# Patient Record
Sex: MALE | Race: OTHER | NOT HISPANIC OR LATINO | URBAN - METROPOLITAN AREA
[De-identification: names, ages, dates, MRNs, and addresses within clinical notes are randomized per-mention and may not be internally consistent; named-entity substitution may affect disease eponyms.]

---

## 2018-10-26 ENCOUNTER — EMERGENCY (EMERGENCY)
Facility: HOSPITAL | Age: 73
LOS: 1 days | Discharge: HOME | End: 2018-10-26
Attending: EMERGENCY MEDICINE
Payer: MEDICARE

## 2018-10-26 VITALS
HEART RATE: 87 BPM | RESPIRATION RATE: 14 BRPM | WEIGHT: 171.08 LBS | DIASTOLIC BLOOD PRESSURE: 74 MMHG | SYSTOLIC BLOOD PRESSURE: 120 MMHG | TEMPERATURE: 98 F

## 2018-10-26 DIAGNOSIS — Z98.89 OTHER SPECIFIED POSTPROCEDURAL STATES: Chronic | ICD-10-CM

## 2018-10-26 PROCEDURE — 73590 X-RAY EXAM OF LOWER LEG: CPT | Mod: 26,LT

## 2018-10-26 PROCEDURE — 93971 EXTREMITY STUDY: CPT | Mod: 26,LT

## 2018-10-26 PROCEDURE — 93971 EXTREMITY STUDY: CPT

## 2018-10-26 PROCEDURE — 99284 EMERGENCY DEPT VISIT MOD MDM: CPT | Mod: 25

## 2018-10-26 PROCEDURE — 73590 X-RAY EXAM OF LOWER LEG: CPT

## 2018-10-26 PROCEDURE — 99284 EMERGENCY DEPT VISIT MOD MDM: CPT

## 2018-10-26 NOTE — ED PROVIDER NOTE - CARE PLAN
Principal Discharge DX:	Leg swelling Principal Discharge DX:	Leg swelling  Secondary Diagnosis:	Hematoma

## 2018-10-26 NOTE — ED PROVIDER NOTE - OBJECTIVE STATEMENT
74 yo male hx of CAD on plavix, HLD, HTN c/o left lower leg pain, bruising and swelling x 4 days after he said he felt something hit him in his left calf while walking in [facility].  No chest pain, no shortness of breath  Here for evaluation.

## 2018-10-26 NOTE — ED PROVIDER NOTE - NS ED ROS FT

## 2018-10-26 NOTE — ED PROVIDER NOTE - PROGRESS NOTE DETAILS
imaging explained to patient, will f/u with PMD Dr. Mckee, told to elevated left leg, warm compress imaging explained to patient, will f/u with PMD Dr. Thurman, told to elevated left leg, warm compress.  Patient keeps insisting on having a "shot" and antibiotics, but explained that those were not necessary.  R&B explained.

## 2018-10-26 NOTE — ED PROVIDER NOTE - PHYSICAL EXAMINATION
Gen: Alert, NAD  Head/eyes: NC/AT, PERRL, EOMI, normal lids/conjunctiva, no scleral icterus  ENT: Bilateral TM WNL, normal hearing, patent oropharynx without erythema/exudate, uvula midline, no peritonsillar abscess, no tongue/uvula swelling  Neck: supple, no tenderness/meningismus/JVD, Trachea midline  Pulm: Bilateral clear BS, normal resp effort, no wheeze/stridor/retractions  CV: RRR, no M/R/G, +2 dist pulses (radial, pedal DP/PT, popliteal)  Abd: soft, NT/ND, +BS, no guarding/rebound tenderness  Musculoskeletal: no edema/erythema/cyanosis, FROM in all extremities, no C/T/L spine ttp  Skin: no rash, no vesicles, no petechaie, left lower leg + ecchymosis on lateral side and near heel of left foot, + swelling  Neuro: AAOx3, CN 2-12 intact, normal sensation, 5/5 motor strength in all extremities, normal gait, no dysmetria Gen: Alert, NAD  Head/eyes: NC/AT, PERRL, EOMI, normal lids/conjunctiva, no scleral icterus  ENT: Bilateral TM WNL, normal hearing, patent oropharynx without erythema/exudate, uvula midline, no peritonsillar abscess, no tongue/uvula swelling  Neck: supple, no tenderness/meningismus/JVD, Trachea midline  Pulm: Bilateral clear BS, normal resp effort, no wheeze/stridor/retractions  CV: RRR, no M/R/G, +2 dopplerable dist pulses (radial, pedal DP/PT, popliteal)  Abd: soft, NT/ND, +BS, no guarding/rebound tenderness  Musculoskeletal: no edema/erythema/cyanosis, FROM in all extremities, no C/T/L spine ttp  Skin: no rash, no vesicles, no petechaie, left lower leg + ecchymosis on lateral side and near heel of left foot, + swelling  Neuro: AAOx3, CN 2-12 intact, normal sensation, 5/5 motor strength in all extremities, normal gait, no dysmetria

## 2018-10-26 NOTE — ED ADULT NURSE NOTE - OBJECTIVE STATEMENT
Received the patient in the Er. Patient is alert and oriented. Skin warm and dry. C/O pain and swelling to left lower leg for 4 days. Hematoma noticed all over left lower leg and foot. Denies any injury. As per patient sudden onset of pain came to left leg while walking.

## 2018-10-26 NOTE — ED ADULT NURSE NOTE - NSIMPLEMENTINTERV_GEN_ALL_ED
Implemented All Fall Risk Interventions:  [place] to call system. Call bell, personal items and telephone within reach. Instruct patient to call for assistance. Room bathroom lighting operational. Non-slip footwear when patient is off stretcher. Physically safe environment: no spills, clutter or unnecessary equipment. Stretcher in lowest position, wheels locked, appropriate side rails in place. Provide visual cue, wrist band, yellow gown, etc. Monitor gait and stability. Monitor for mental status changes and reorient to person, place, and time. Review medications for side effects contributing to fall risk. Reinforce activity limits and safety measures with patient and family.

## 2019-04-26 ENCOUNTER — EMERGENCY (EMERGENCY)
Facility: HOSPITAL | Age: 74
LOS: 1 days | Discharge: HOME | End: 2019-04-26
Attending: EMERGENCY MEDICINE | Admitting: EMERGENCY MEDICINE
Payer: MEDICARE

## 2019-04-26 VITALS
DIASTOLIC BLOOD PRESSURE: 75 MMHG | SYSTOLIC BLOOD PRESSURE: 129 MMHG | TEMPERATURE: 98 F | HEART RATE: 85 BPM | HEIGHT: 67 IN | WEIGHT: 179.9 LBS | RESPIRATION RATE: 18 BRPM | OXYGEN SATURATION: 99 %

## 2019-04-26 DIAGNOSIS — Z98.89 OTHER SPECIFIED POSTPROCEDURAL STATES: Chronic | ICD-10-CM

## 2019-04-26 LAB — S PYO AG SPEC QL IA: NEGATIVE — SIGNIFICANT CHANGE UP

## 2019-04-26 PROCEDURE — 99283 EMERGENCY DEPT VISIT LOW MDM: CPT | Mod: 25

## 2019-04-26 PROCEDURE — 87880 STREP A ASSAY W/OPTIC: CPT

## 2019-04-26 PROCEDURE — 99283 EMERGENCY DEPT VISIT LOW MDM: CPT

## 2019-04-26 PROCEDURE — 71046 X-RAY EXAM CHEST 2 VIEWS: CPT | Mod: 26

## 2019-04-26 PROCEDURE — 71046 X-RAY EXAM CHEST 2 VIEWS: CPT

## 2019-04-26 PROCEDURE — 87081 CULTURE SCREEN ONLY: CPT

## 2019-04-26 RX ORDER — POLYMYXIN B SULF/TRIMETHOPRIM 10000-1/ML
1 DROPS OPHTHALMIC (EYE) ONCE
Qty: 0 | Refills: 0 | Status: COMPLETED | OUTPATIENT
Start: 2019-04-26 | End: 2019-04-26

## 2019-04-26 RX ADMIN — Medication 1 DROP(S): at 14:38

## 2019-04-26 NOTE — ED PROVIDER NOTE - OBJECTIVE STATEMENT
75 y/o male hx of CAD, HTN, HLD who presents with 2 days of sore throat, dry cough and watery eyes. no fever/chills. no sputum production. no chest pain/sob/abd pain/v/d. no sick contacts or recent travel.  no trouble drinking or managing secretions.

## 2019-04-26 NOTE — ED PROVIDER NOTE - CLINICAL SUMMARY MEDICAL DECISION MAKING FREE TEXT BOX
73 y/o male hx of CAD, HTN, HLD who presents with 2 days of sore throat, dry cough and watery eyes. patient well appearing with normal vital signs. + B/L eyes with purulent drainage and conjunctival injection, mouth and throat WNL, Lungs CTAB. will r/o strep and pna and treat bacterial conjunctivitis - Maria Del Rosario Chisholm PA-C

## 2019-04-26 NOTE — ED PROVIDER NOTE - ATTENDING CONTRIBUTION TO CARE
73 yo M p/w R eye redness , followed by L eye redness. pt awoke today with eye discharge.  Pt with mild cough, slight sore throat as well. no dysphagia. No cp/sob/palp. No fever/chills. no weakness / dizzy / malaise. No agg/allev factors. no other inj or co.  exam: mm moist. neck supple. no meningeal signs. lungs CTA bl, no w/r/r Pos bl conjuncival  injection , watery dc. no FB seen. Nl visual acuity. no other acute findings.  Conjunctivitis (no contacts), URI. Check XR, topical abx, outpt fu

## 2019-04-26 NOTE — ED ADULT NURSE NOTE - OBJECTIVE STATEMENT
Received the patient in the Er. Patient is alert and oriented. Skin warm and dry. /O sore throat and redness to left eye. Received the patient in the Er. Patient is alert and oriented. Skin warm and dry. /O sore throat and redness to bilateral eye.

## 2019-04-26 NOTE — ED PROVIDER NOTE - PLAN OF CARE
stay hydrated  Follow up with your Primary Care Physician within the next 2-3 days  Bring a copy of your test results with you to your appointment  Continue your current medication regimen  Return to the Emergency Room if you experience new or worsening symptoms  Take Tylenol 650mg every 6 hrs as needed for fever  2 drops of antibiotic eye drop every 4 hours for 10 days  conjunctivitis is very contagious so be sure to wash hands frequently with soap and water.  you can also purchase over the counter cough and cold medication for symptoms

## 2019-04-26 NOTE — ED PROVIDER NOTE - CARE PLAN
Principal Discharge DX:	URI (upper respiratory infection) Principal Discharge DX:	URI (upper respiratory infection)  Assessment and plan of treatment:	stay hydrated  Follow up with your Primary Care Physician within the next 2-3 days  Bring a copy of your test results with you to your appointment  Continue your current medication regimen  Return to the Emergency Room if you experience new or worsening symptoms  Take Tylenol 650mg every 6 hrs as needed for fever  2 drops of antibiotic eye drop every 4 hours for 10 days  conjunctivitis is very contagious so be sure to wash hands frequently with soap and water.  you can also purchase over the counter cough and cold medication for symptoms

## 2019-04-26 NOTE — ED PROVIDER NOTE - EYES, MLM
Clear bilaterally, pupils equal, round and reactive to light. + conjunctival injection bilaterally with + purulent drainage

## 2019-04-28 LAB
CULTURE RESULTS: SIGNIFICANT CHANGE UP
SPECIMEN SOURCE: SIGNIFICANT CHANGE UP

## 2019-05-27 NOTE — ED ADULT NURSE NOTE - NS ED NURSE RECORD ANOTHER HT AND WT
Yes Scribe Attestation (For Scribes USE Only)... Attending Attestation (For Attendings USE Only).../Scribe Attestation (For Scribes USE Only)...

## 2020-12-04 NOTE — ED ADULT NURSE NOTE - NS ED NURSE RECORD ANOTHER VITAL SIGN
December 4, 2020      Ambrose Melendez MD  [address]  [facility] [address]           [facility] 4[facility] Fl  [address]  [facility] [address]  Phone: [phone]          Patient: Braeden Keita   MR Number: [id number]   YOB: 1997   Date of Visit: 12/4/2020       Dear Dr. Ambrose Melendez:    Thank you for referring Braeden Keita to me for evaluation. Attached you will find relevant portions of my assessment and plan of care.    If you have questions, please do not hesitate to call me. I look forward to following Braeden Keita along with you.    Sincerely,    Carlyn Barrios MD    Enclosure  CC:  No Recipients    If you would like to receive this communication electronically, please contact [email or web address] or [phone] to request more information on [employer] access.    For providers and/or their staff who would like to refer a patient to [facility], please contact us through our one-stop-shop provider referral line, [facility], at [phone].    If you feel you have received this communication in error or would no longer like to receive these types of communications, please e-mail [email or web address]         
Yes

## 2021-12-12 ENCOUNTER — INPATIENT (INPATIENT)
Facility: HOSPITAL | Age: 76
LOS: 0 days | Discharge: ANOTHER HOSPITAL | DRG: 303 | End: 2021-12-13
Attending: INTERNAL MEDICINE | Admitting: INTERNAL MEDICINE
Payer: MEDICARE

## 2021-12-12 VITALS
SYSTOLIC BLOOD PRESSURE: 170 MMHG | HEART RATE: 66 BPM | OXYGEN SATURATION: 97 % | HEIGHT: 67 IN | WEIGHT: 177.91 LBS | TEMPERATURE: 98 F | RESPIRATION RATE: 18 BRPM | DIASTOLIC BLOOD PRESSURE: 90 MMHG

## 2021-12-12 DIAGNOSIS — Z98.89 OTHER SPECIFIED POSTPROCEDURAL STATES: Chronic | ICD-10-CM

## 2021-12-12 DIAGNOSIS — I20.0 UNSTABLE ANGINA: ICD-10-CM

## 2021-12-12 LAB
ALBUMIN SERPL ELPH-MCNC: 3.4 G/DL — SIGNIFICANT CHANGE UP (ref 3.3–5)
ALP SERPL-CCNC: 95 U/L — SIGNIFICANT CHANGE UP (ref 40–120)
ALT FLD-CCNC: 38 U/L — SIGNIFICANT CHANGE UP (ref 12–78)
ANION GAP SERPL CALC-SCNC: 6 MMOL/L — SIGNIFICANT CHANGE UP (ref 5–17)
APTT BLD: 31.3 SEC — SIGNIFICANT CHANGE UP (ref 27.5–35.5)
AST SERPL-CCNC: 25 U/L — SIGNIFICANT CHANGE UP (ref 15–37)
BASOPHILS # BLD AUTO: 0.06 K/UL — SIGNIFICANT CHANGE UP (ref 0–0.2)
BASOPHILS NFR BLD AUTO: 1 % — SIGNIFICANT CHANGE UP (ref 0–2)
BILIRUB SERPL-MCNC: 0.7 MG/DL — SIGNIFICANT CHANGE UP (ref 0.2–1.2)
BUN SERPL-MCNC: 16 MG/DL — SIGNIFICANT CHANGE UP (ref 7–23)
CALCIUM SERPL-MCNC: 8.7 MG/DL — SIGNIFICANT CHANGE UP (ref 8.5–10.1)
CHLORIDE SERPL-SCNC: 107 MMOL/L — SIGNIFICANT CHANGE UP (ref 96–108)
CO2 SERPL-SCNC: 28 MMOL/L — SIGNIFICANT CHANGE UP (ref 22–31)
CREAT SERPL-MCNC: 1.4 MG/DL — HIGH (ref 0.5–1.3)
EOSINOPHIL # BLD AUTO: 0.23 K/UL — SIGNIFICANT CHANGE UP (ref 0–0.5)
EOSINOPHIL NFR BLD AUTO: 4 % — SIGNIFICANT CHANGE UP (ref 0–6)
GLUCOSE SERPL-MCNC: 160 MG/DL — HIGH (ref 70–99)
HCT VFR BLD CALC: 41.1 % — SIGNIFICANT CHANGE UP (ref 39–50)
HGB BLD-MCNC: 14.5 G/DL — SIGNIFICANT CHANGE UP (ref 13–17)
IMM GRANULOCYTES NFR BLD AUTO: 0.2 % — SIGNIFICANT CHANGE UP (ref 0–1.5)
INR BLD: 1.02 RATIO — SIGNIFICANT CHANGE UP (ref 0.88–1.16)
LIDOCAIN IGE QN: 181 U/L — SIGNIFICANT CHANGE UP (ref 73–393)
LYMPHOCYTES # BLD AUTO: 2.52 K/UL — SIGNIFICANT CHANGE UP (ref 1–3.3)
LYMPHOCYTES # BLD AUTO: 43.8 % — SIGNIFICANT CHANGE UP (ref 13–44)
MAGNESIUM SERPL-MCNC: 2.3 MG/DL — SIGNIFICANT CHANGE UP (ref 1.6–2.6)
MCHC RBC-ENTMCNC: 31.7 PG — SIGNIFICANT CHANGE UP (ref 27–34)
MCHC RBC-ENTMCNC: 35.3 GM/DL — SIGNIFICANT CHANGE UP (ref 32–36)
MCV RBC AUTO: 89.9 FL — SIGNIFICANT CHANGE UP (ref 80–100)
MONOCYTES # BLD AUTO: 0.58 K/UL — SIGNIFICANT CHANGE UP (ref 0–0.9)
MONOCYTES NFR BLD AUTO: 10.1 % — SIGNIFICANT CHANGE UP (ref 2–14)
NEUTROPHILS # BLD AUTO: 2.35 K/UL — SIGNIFICANT CHANGE UP (ref 1.8–7.4)
NEUTROPHILS NFR BLD AUTO: 40.9 % — LOW (ref 43–77)
NRBC # BLD: 0 /100 WBCS — SIGNIFICANT CHANGE UP (ref 0–0)
PLATELET # BLD AUTO: 170 K/UL — SIGNIFICANT CHANGE UP (ref 150–400)
POTASSIUM SERPL-MCNC: 4.1 MMOL/L — SIGNIFICANT CHANGE UP (ref 3.5–5.3)
POTASSIUM SERPL-SCNC: 4.1 MMOL/L — SIGNIFICANT CHANGE UP (ref 3.5–5.3)
PROT SERPL-MCNC: 7 G/DL — SIGNIFICANT CHANGE UP (ref 6–8.3)
PROTHROM AB SERPL-ACNC: 11.9 SEC — SIGNIFICANT CHANGE UP (ref 10.6–13.6)
RBC # BLD: 4.57 M/UL — SIGNIFICANT CHANGE UP (ref 4.2–5.8)
RBC # FLD: 13.2 % — SIGNIFICANT CHANGE UP (ref 10.3–14.5)
SARS-COV-2 RNA SPEC QL NAA+PROBE: SIGNIFICANT CHANGE UP
SODIUM SERPL-SCNC: 141 MMOL/L — SIGNIFICANT CHANGE UP (ref 135–145)
TROPONIN I, HIGH SENSITIVITY RESULT: 9.5 NG/L — SIGNIFICANT CHANGE UP
TROPONIN I, HIGH SENSITIVITY RESULT: 9.8 NG/L — SIGNIFICANT CHANGE UP
WBC # BLD: 5.75 K/UL — SIGNIFICANT CHANGE UP (ref 3.8–10.5)
WBC # FLD AUTO: 5.75 K/UL — SIGNIFICANT CHANGE UP (ref 3.8–10.5)

## 2021-12-12 PROCEDURE — 71045 X-RAY EXAM CHEST 1 VIEW: CPT | Mod: 26

## 2021-12-12 PROCEDURE — 93010 ELECTROCARDIOGRAM REPORT: CPT

## 2021-12-12 PROCEDURE — 99285 EMERGENCY DEPT VISIT HI MDM: CPT

## 2021-12-12 RX ORDER — ATORVASTATIN CALCIUM 80 MG/1
40 TABLET, FILM COATED ORAL AT BEDTIME
Refills: 0 | Status: DISCONTINUED | OUTPATIENT
Start: 2021-12-12 | End: 2021-12-13

## 2021-12-12 RX ORDER — HEPARIN SODIUM 5000 [USP'U]/ML
5000 INJECTION INTRAVENOUS; SUBCUTANEOUS ONCE
Refills: 0 | Status: COMPLETED | OUTPATIENT
Start: 2021-12-12 | End: 2021-12-12

## 2021-12-12 RX ORDER — ACETAMINOPHEN 500 MG
650 TABLET ORAL EVERY 6 HOURS
Refills: 0 | Status: DISCONTINUED | OUTPATIENT
Start: 2021-12-12 | End: 2021-12-13

## 2021-12-12 RX ORDER — ATENOLOL 25 MG/1
50 TABLET ORAL DAILY
Refills: 0 | Status: DISCONTINUED | OUTPATIENT
Start: 2021-12-12 | End: 2021-12-13

## 2021-12-12 RX ORDER — HEPARIN SODIUM 5000 [USP'U]/ML
5000 INJECTION INTRAVENOUS; SUBCUTANEOUS EVERY 6 HOURS
Refills: 0 | Status: DISCONTINUED | OUTPATIENT
Start: 2021-12-12 | End: 2021-12-13

## 2021-12-12 RX ORDER — RANOLAZINE 500 MG/1
1000 TABLET, FILM COATED, EXTENDED RELEASE ORAL
Refills: 0 | Status: DISCONTINUED | OUTPATIENT
Start: 2021-12-12 | End: 2021-12-13

## 2021-12-12 RX ORDER — CLOPIDOGREL BISULFATE 75 MG/1
75 TABLET, FILM COATED ORAL DAILY
Refills: 0 | Status: DISCONTINUED | OUTPATIENT
Start: 2021-12-12 | End: 2021-12-13

## 2021-12-12 RX ORDER — ASPIRIN/CALCIUM CARB/MAGNESIUM 324 MG
325 TABLET ORAL DAILY
Refills: 0 | Status: DISCONTINUED | OUTPATIENT
Start: 2021-12-13 | End: 2021-12-13

## 2021-12-12 RX ORDER — ASPIRIN/CALCIUM CARB/MAGNESIUM 324 MG
325 TABLET ORAL ONCE
Refills: 0 | Status: COMPLETED | OUTPATIENT
Start: 2021-12-12 | End: 2021-12-12

## 2021-12-12 RX ORDER — HEPARIN SODIUM 5000 [USP'U]/ML
INJECTION INTRAVENOUS; SUBCUTANEOUS
Qty: 25000 | Refills: 0 | Status: DISCONTINUED | OUTPATIENT
Start: 2021-12-12 | End: 2021-12-13

## 2021-12-12 RX ORDER — LOSARTAN POTASSIUM 100 MG/1
50 TABLET, FILM COATED ORAL DAILY
Refills: 0 | Status: DISCONTINUED | OUTPATIENT
Start: 2021-12-12 | End: 2021-12-12

## 2021-12-12 RX ADMIN — HEPARIN SODIUM 1000 UNIT(S)/HR: 5000 INJECTION INTRAVENOUS; SUBCUTANEOUS at 23:10

## 2021-12-12 RX ADMIN — Medication 325 MILLIGRAM(S): at 18:23

## 2021-12-12 RX ADMIN — HEPARIN SODIUM 1000 UNIT(S)/HR: 5000 INJECTION INTRAVENOUS; SUBCUTANEOUS at 20:56

## 2021-12-12 RX ADMIN — HEPARIN SODIUM 5000 UNIT(S): 5000 INJECTION INTRAVENOUS; SUBCUTANEOUS at 20:55

## 2021-12-12 NOTE — ED PROVIDER NOTE - CLINICAL SUMMARY MEDICAL DECISION MAKING FREE TEXT BOX
77 yo male who has hx of acs cad with stent now worsening anginal sx wthi mcnair  ro acs ro other causes of mcnair with cp and neck pain   ekg cardiac monitor lyges labs trop   cards consultation. asa

## 2021-12-12 NOTE — ED PROVIDER NOTE - PROGRESS NOTE DETAILS
cardiology consulted dr PREET krishnamurthy per cards pt needs cath to be admitted for unstable angina and transferred in am   dr umm kirk for dr krystyna guzman for admission

## 2021-12-12 NOTE — ED ADULT NURSE NOTE - OBJECTIVE STATEMENT
Patient A/Ox4 with a steady gait. Wife at bedside. Patient c/o CP that worsens with walking and worsened over the past week. Has significant medical hx including stents. Telemetry monitor on. Call light in reach.

## 2021-12-12 NOTE — ED PROVIDER NOTE - OBJECTIVE STATEMENT
Pt is  a 77 yo male hx of cad with stent at [facility] htn hld was in [facility] until yest when he began to have increased anginal sx (throat pain and dyspnea on exertion) today the pain got worse and went into his chest so he came to er.  His pmd is dr Thurman he has no local cardiologist. no allergies to meds  and at this moment he has no pain or discomfort.  no edema

## 2021-12-12 NOTE — CONSULT NOTE ADULT - ASSESSMENT
76 year old M with hx CAD s/p remote PCI and known residual closed vessel, HTN, HLD boderline DM presents with angina    - Now with typical anginal symptoms.   - EKG without sign ischemic changes  - Check trops  - would give full dose AC until Dae neg  - Cont DAPT and statin  - cont bb  - cont statin  - cont ranexa and norvasc for antianginal support  - will plan on coronary angiography brianda barring any major lab derrangements. Risk (including but not limited to periprocedureal MI and death), benefits, alternatives fully explained to the patient. The patient agrees to the procedure.  - monitor tele  - Further cardiac workup will depend on clinical course.

## 2021-12-12 NOTE — CONSULT NOTE ADULT - SUBJECTIVE AND OBJECTIVE BOX
CHIEF COMPLAINT: Patient is a 76y old  Male who presents with a chief complaint of chest     HPI: 76 year old M with hx CAD s/p remote PCI and known residual closed vessel, HTN, HLD boderline DM presents with CP. Has been having pressure like CP with walking for last 2 weeks similar to pain he experienced prior to his last PCI. Now wtih CP at rest. + SOB  Denies any   palpitations, PND, orthopnea, near syncope, syncope, lower extremity edema, stroke like symptoms.       EKG: SR    REVIEW OF SYSTEMS:   All other review of systems are negative unless indicated above    PAST MEDICAL & SURGICAL HISTORY:  High cholesterol    Cardiac disease    HTN (hypertension)    H/O angioplasty        SOCIAL HISTORY:  No tobacco, ethanol, or drug abuse.    FAMILY HISTORY:    No family history of acute MI or sudden cardiac death.    MEDICATIONS  (STANDING):    MEDICATIONS  (PRN):      Allergies    No Known Allergies    Intolerances        Home meds:  Home Medications:  aspirin 325 mg oral tablet: 1 tab(s) orally once a day (26 Oct 2018 14:36)  atenolol 50 mg oral tablet: 1 tab(s) orally once a day (26 Oct 2018 14:36)  atorvastatin 40 mg oral tablet: 1 tab(s) orally once a day (at bedtime) (26 Oct 2018 14:36)  clopidogrel 75 mg oral tablet: 1 tab(s) orally once a day (26 Oct 2018 14:36)  famotidine 10 mg/mL intravenous solution: 2 milliliter(s) intravenous every 12 hours (26 Oct 2018 14:36)  losartan 50 mg oral tablet: 1 tab(s) orally once a day (26 Oct 2018 14:36)  ranolazine 1000 mg oral tablet, extended release: 1 tab(s) orally once a day (26 Oct 2018 14:36)        VITAL SIGNS:   Vital Signs Last 24 Hrs  T(C): 36.7 (12 Dec 2021 17:40), Max: 36.7 (12 Dec 2021 17:40)  T(F): 98 (12 Dec 2021 17:40), Max: 98 (12 Dec 2021 17:40)  HR: 65 (12 Dec 2021 18:21) (65 - 66)  BP: 155/90 (12 Dec 2021 18:21) (155/90 - 170/90)  BP(mean): 90 (12 Dec 2021 18:21) (90 - 90)  RR: 18 (12 Dec 2021 18:21) (18 - 18)  SpO2: 98% (12 Dec 2021 18:21) (97% - 98%)    I&O's Summary      On Exam:     Constitutional: NAD, awake and alert, well-developed  HEENT: Moist Mucous Membranes, Anicteric  Pulmonary: Non-labored, breath sounds are clear bilaterally, No wheezing, rales or rhonchi  Cardiovascular: Regular, S1 and S2, No murmurs, rubs, gallops or clicks  Gastrointestinal: Bowel Sounds present, soft, nontender.   Lymph: No peripheral edema. No lymphadenopathy.  Skin: No visible rashes or ulcers.  Psych:  Mood & affect appropriate    LABS: All Labs Reviewed:                Blood Culture:         RADIOLOGY:

## 2021-12-12 NOTE — H&P ADULT - HISTORY OF PRESENT ILLNESS
76 year old M with hx CAD s/p remote PCI and known residual closed vessel, HTN, HLD boderline DM presents with CP. Has been having pressure like CP with walking for last 2 weeks similar to pain he experienced prior to his last PCI. Now wtih CP at rest. + SOB  Denies any   palpitations, PND, orthopnea, near syncope, syncope, lower extremity edema, stroke like symptoms.

## 2021-12-12 NOTE — H&P ADULT - NSHPPHYSICALEXAM_GEN_ALL_CORE
General: WN/WD NAD  PERRLA  Neurology: A&Ox3, nonfocal, ENRIQUEZ x 4  Respiratory: CTA B/L  CV: RRR, S1S2, no murmurs, rubs or gallops  Abdominal: Soft, NT, ND +BS, Last BM  Extremities: No edema, + peripheral pulses  Skin Normal

## 2021-12-12 NOTE — H&P ADULT - NSHPLABSRESULTS_GEN_ALL_CORE
Lab Results:  CBC  CBC Full  -  ( 12 Dec 2021 18:37 )  WBC Count : 5.75 K/uL  RBC Count : 4.57 M/uL  Hemoglobin : 14.5 g/dL  Hematocrit : 41.1 %  Platelet Count - Automated : 170 K/uL  Mean Cell Volume : 89.9 fl  Mean Cell Hemoglobin : 31.7 pg  Mean Cell Hemoglobin Concentration : 35.3 gm/dL  Auto Neutrophil # : 2.35 K/uL  Auto Lymphocyte # : 2.52 K/uL  Auto Monocyte # : 0.58 K/uL  Auto Eosinophil # : 0.23 K/uL  Auto Basophil # : 0.06 K/uL  Auto Neutrophil % : 40.9 %  Auto Lymphocyte % : 43.8 %  Auto Monocyte % : 10.1 %  Auto Eosinophil % : 4.0 %  Auto Basophil % : 1.0 %    .		Differential:	[] Automated		[] Manual  Chemistry                        14.5   5.75  )-----------( 170      ( 12 Dec 2021 18:37 )             41.1     12-12    141  |  107  |  16  ----------------------------<  160<H>  4.1   |  28  |  1.40<H>    Ca    8.7      12 Dec 2021 18:37  Mg     2.3     12-12    TPro  7.0  /  Alb  3.4  /  TBili  0.7  /  DBili  x   /  AST  25  /  ALT  38  /  AlkPhos  95  12-12    LIVER FUNCTIONS - ( 12 Dec 2021 18:37 )  Alb: 3.4 g/dL / Pro: 7.0 g/dL / ALK PHOS: 95 U/L / ALT: 38 U/L / AST: 25 U/L / GGT: x                     MICROBIOLOGY/CULTURES:      RADIOLOGY RESULTS: reviewed

## 2021-12-13 ENCOUNTER — INPATIENT (INPATIENT)
Facility: HOSPITAL | Age: 76
LOS: 0 days | Discharge: HOME | DRG: 287 | End: 2021-12-13
Attending: INTERNAL MEDICINE | Admitting: INTERNAL MEDICINE
Payer: MEDICARE

## 2021-12-13 ENCOUNTER — TRANSCRIPTION ENCOUNTER (OUTPATIENT)
Age: 76
End: 2021-12-13

## 2021-12-13 VITALS
DIASTOLIC BLOOD PRESSURE: 107 MMHG | WEIGHT: 177.91 LBS | SYSTOLIC BLOOD PRESSURE: 165 MMHG | RESPIRATION RATE: 17 BRPM | TEMPERATURE: 98 F | HEART RATE: 57 BPM | HEIGHT: 67 IN | OXYGEN SATURATION: 100 %

## 2021-12-13 VITALS
OXYGEN SATURATION: 96 % | HEART RATE: 55 BPM | RESPIRATION RATE: 18 BRPM | TEMPERATURE: 97 F | DIASTOLIC BLOOD PRESSURE: 97 MMHG | SYSTOLIC BLOOD PRESSURE: 153 MMHG

## 2021-12-13 VITALS
HEART RATE: 55 BPM | DIASTOLIC BLOOD PRESSURE: 88 MMHG | SYSTOLIC BLOOD PRESSURE: 148 MMHG | OXYGEN SATURATION: 98 % | RESPIRATION RATE: 17 BRPM

## 2021-12-13 DIAGNOSIS — R07.9 CHEST PAIN, UNSPECIFIED: ICD-10-CM

## 2021-12-13 DIAGNOSIS — Z98.89 OTHER SPECIFIED POSTPROCEDURAL STATES: Chronic | ICD-10-CM

## 2021-12-13 LAB
ALBUMIN SERPL ELPH-MCNC: 3.4 G/DL — SIGNIFICANT CHANGE UP (ref 3.3–5)
ALP SERPL-CCNC: 81 U/L — SIGNIFICANT CHANGE UP (ref 40–120)
ALT FLD-CCNC: 36 U/L — SIGNIFICANT CHANGE UP (ref 12–78)
ANION GAP SERPL CALC-SCNC: 5 MMOL/L — SIGNIFICANT CHANGE UP (ref 5–17)
APTT BLD: 83 SEC — HIGH (ref 27.5–35.5)
APTT BLD: > 200 SEC (ref 27.5–35.5)
AST SERPL-CCNC: 18 U/L — SIGNIFICANT CHANGE UP (ref 15–37)
BILIRUB SERPL-MCNC: 0.6 MG/DL — SIGNIFICANT CHANGE UP (ref 0.2–1.2)
BUN SERPL-MCNC: 14 MG/DL — SIGNIFICANT CHANGE UP (ref 7–23)
CALCIUM SERPL-MCNC: 8.8 MG/DL — SIGNIFICANT CHANGE UP (ref 8.5–10.1)
CHLORIDE SERPL-SCNC: 109 MMOL/L — HIGH (ref 96–108)
CK SERPL-CCNC: 65 U/L — SIGNIFICANT CHANGE UP (ref 26–308)
CO2 SERPL-SCNC: 28 MMOL/L — SIGNIFICANT CHANGE UP (ref 22–31)
CREAT SERPL-MCNC: 1 MG/DL — SIGNIFICANT CHANGE UP (ref 0.5–1.3)
GLUCOSE SERPL-MCNC: 147 MG/DL — HIGH (ref 70–99)
HCT VFR BLD CALC: 38.8 % — LOW (ref 39–50)
HCT VFR BLD CALC: 40.1 % — SIGNIFICANT CHANGE UP (ref 39–50)
HCV AB S/CO SERPL IA: 0.1 S/CO — SIGNIFICANT CHANGE UP (ref 0–0.99)
HCV AB SERPL-IMP: SIGNIFICANT CHANGE UP
HGB BLD-MCNC: 13.9 G/DL — SIGNIFICANT CHANGE UP (ref 13–17)
HGB BLD-MCNC: 14.3 G/DL — SIGNIFICANT CHANGE UP (ref 13–17)
MCHC RBC-ENTMCNC: 31.8 PG — SIGNIFICANT CHANGE UP (ref 27–34)
MCHC RBC-ENTMCNC: 32 PG — SIGNIFICANT CHANGE UP (ref 27–34)
MCHC RBC-ENTMCNC: 35.7 GM/DL — SIGNIFICANT CHANGE UP (ref 32–36)
MCHC RBC-ENTMCNC: 35.8 GM/DL — SIGNIFICANT CHANGE UP (ref 32–36)
MCV RBC AUTO: 89.2 FL — SIGNIFICANT CHANGE UP (ref 80–100)
MCV RBC AUTO: 89.3 FL — SIGNIFICANT CHANGE UP (ref 80–100)
NRBC # BLD: 0 /100 WBCS — SIGNIFICANT CHANGE UP (ref 0–0)
NRBC # BLD: 0 /100 WBCS — SIGNIFICANT CHANGE UP (ref 0–0)
PLATELET # BLD AUTO: 153 K/UL — SIGNIFICANT CHANGE UP (ref 150–400)
PLATELET # BLD AUTO: 159 K/UL — SIGNIFICANT CHANGE UP (ref 150–400)
POTASSIUM SERPL-MCNC: 3.8 MMOL/L — SIGNIFICANT CHANGE UP (ref 3.5–5.3)
POTASSIUM SERPL-SCNC: 3.8 MMOL/L — SIGNIFICANT CHANGE UP (ref 3.5–5.3)
PROT SERPL-MCNC: 6.6 G/DL — SIGNIFICANT CHANGE UP (ref 6–8.3)
RBC # BLD: 4.35 M/UL — SIGNIFICANT CHANGE UP (ref 4.2–5.8)
RBC # BLD: 4.49 M/UL — SIGNIFICANT CHANGE UP (ref 4.2–5.8)
RBC # FLD: 13 % — SIGNIFICANT CHANGE UP (ref 10.3–14.5)
RBC # FLD: 13.1 % — SIGNIFICANT CHANGE UP (ref 10.3–14.5)
SODIUM SERPL-SCNC: 142 MMOL/L — SIGNIFICANT CHANGE UP (ref 135–145)
WBC # BLD: 6.01 K/UL — SIGNIFICANT CHANGE UP (ref 3.8–10.5)
WBC # BLD: 6.07 K/UL — SIGNIFICANT CHANGE UP (ref 3.8–10.5)
WBC # FLD AUTO: 6.01 K/UL — SIGNIFICANT CHANGE UP (ref 3.8–10.5)
WBC # FLD AUTO: 6.07 K/UL — SIGNIFICANT CHANGE UP (ref 3.8–10.5)

## 2021-12-13 PROCEDURE — 99233 SBSQ HOSP IP/OBS HIGH 50: CPT

## 2021-12-13 PROCEDURE — 99152 MOD SED SAME PHYS/QHP 5/>YRS: CPT

## 2021-12-13 PROCEDURE — 93454 CORONARY ARTERY ANGIO S&I: CPT | Mod: 26

## 2021-12-13 RX ADMIN — HEPARIN SODIUM 750 UNIT(S)/HR: 5000 INJECTION INTRAVENOUS; SUBCUTANEOUS at 07:05

## 2021-12-13 RX ADMIN — HEPARIN SODIUM 0 UNIT(S)/HR: 5000 INJECTION INTRAVENOUS; SUBCUTANEOUS at 03:14

## 2021-12-13 RX ADMIN — CLOPIDOGREL BISULFATE 75 MILLIGRAM(S): 75 TABLET, FILM COATED ORAL at 11:36

## 2021-12-13 RX ADMIN — HEPARIN SODIUM 750 UNIT(S)/HR: 5000 INJECTION INTRAVENOUS; SUBCUTANEOUS at 04:14

## 2021-12-13 RX ADMIN — HEPARIN SODIUM 750 UNIT(S)/HR: 5000 INJECTION INTRAVENOUS; SUBCUTANEOUS at 07:15

## 2021-12-13 RX ADMIN — ATENOLOL 50 MILLIGRAM(S): 25 TABLET ORAL at 05:20

## 2021-12-13 RX ADMIN — RANOLAZINE 1000 MILLIGRAM(S): 500 TABLET, FILM COATED, EXTENDED RELEASE ORAL at 05:20

## 2021-12-13 RX ADMIN — Medication 325 MILLIGRAM(S): at 11:36

## 2021-12-13 NOTE — DISCHARGE NOTE PROVIDER - NSDCMRMEDTOKEN_GEN_ALL_CORE_FT
aspirin 325 mg oral tablet: 1 tab(s) orally once a day  home/hosp  atenolol 50 mg oral tablet: 1 tab(s) orally once a day  home/hosp  atorvastatin 40 mg oral tablet: 1 tab(s) orally once a day (at bedtime)  home/hosp  clopidogrel 75 mg oral tablet: 1 tab(s) orally once a day  home/hosp  losartan 50 mg oral tablet: 1 tab(s) orally once a day  home/hosp  ranolazine 1000 mg oral tablet, extended release: 1 tab(s) orally once a day  home/hosp

## 2021-12-13 NOTE — H&P CARDIOLOGY - HISTORY OF PRESENT ILLNESS
76 year old Male (____ implantable devices) with a PMHx of CAD s/p remote PCI and known residual closed vessel, HTN, HLD, borderline DM presents with CP. Has been having pressure like CP with walking for last 2 weeks similar to pain he experienced prior to his last PCI. Pt states his symptoms began to worsen-now with associated throat pain and HERNANDEZ. He presented to [facility] ED 1 day ago, trops were negative x2, no acute changes on EKG. Given symptoms he now transfers to [facility] for cardiac cath to further evaluate for ischemia. He currently denies any complaints of chest pain, SOB, palpitations, N/V.  76 year old Male (no implantable devices) with a PMHx of CAD s/p remote PCI and known residual closed vessel, HTN, HLD, borderline DM presents with CP. Has been having pressure like CP with walking for last 2 weeks similar to pain he experienced prior to his last PCI. Pt states his symptoms began to worsen-now with associated throat pain and HERNANDEZ. He presented to [facility] ED 1 [facility] ago, trops were negative x2, no acute changes on EKG. Given symptoms he now transfers to [facility] for cardiac cath to further evaluate for ischemia. He currently denies any complaints of chest pain, SOB, palpitations, N/V.

## 2021-12-13 NOTE — DISCHARGE NOTE PROVIDER - HOSPITAL COURSE
76 year old M with hx CAD s/p remote PCI and known residual closed vessel, HTN, HLD boderline DM presents with CP. Has been having pressure like CP with walking for last 2 weeks similar to pain he experienced prior to his last PCI. Now wtih CP at rest. + SOB  Denies any   palpitations, PND, orthopnea, near syncope, syncope, lower extremity edema, stroke like symptoms.     1 RO ACS  - telemonitor  - heparin drip as per cards  - cw asa, plavix and statin  - cath at [place] today   - will monitor     2 JEFFRY  - hold losartan  - monitor bmp    3 HTN  - dash diet  - cw home meds    4 HLD  - cw statin

## 2021-12-13 NOTE — DISCHARGE NOTE NURSING/CASE MANAGEMENT/SOCIAL WORK - PATIENT PORTAL LINK FT
You can access the FollowMyHealth Patient Portal offered by [facility] by registering at the following website: [email or web address]. By joining [facility]’s FollowMyHealth portal, you will also be able to view your health information using other applications (apps) compatible with our system.

## 2021-12-13 NOTE — ASU PATIENT PROFILE, ADULT - FALL HARM RISK - UNIVERSAL INTERVENTIONS
Bed in lowest position, wheels locked, appropriate side rails in place/Call bell, personal items and telephone in reach/Instruct patient to call for assistance before getting out of bed or chair/Non-slip footwear when patient is out of bed/[place] to call system/Physically safe environment - no spills, clutter or unnecessary equipment/Purposeful Proactive Rounding/Room/bathroom lighting operational, light cord in reach

## 2021-12-13 NOTE — PROGRESS NOTE ADULT - SUBJECTIVE AND OBJECTIVE BOX
[facility] -- Nasir Last, Janet, Nela, Ranjeet Nichols Savella  Office # [phone]      Follow Up:    cad   Subjective/Observations:     No events overnight resting comfortably in bed.  No complaints of chest pain, dyspnea, or palpitations reported. No signs of orthopnea or PND.     REVIEW OF SYSTEMS: All other review of systems is negative unless indicated above    PAST MEDICAL & SURGICAL HISTORY:  High cholesterol    Cardiac disease    HTN (hypertension)    H/O angioplasty        MEDICATIONS  (STANDING):  aspirin 325 milliGRAM(s) Oral daily  ATENolol  Tablet 50 milliGRAM(s) Oral daily  atorvastatin 40 milliGRAM(s) Oral at bedtime  clopidogrel Tablet 75 milliGRAM(s) Oral daily  heparin  Infusion.  Unit(s)/Hr (10 mL/Hr) IV Continuous <Continuous>  ranolazine 1000 milliGRAM(s) Oral two times a day    MEDICATIONS  (PRN):  acetaminophen     Tablet .. 650 milliGRAM(s) Oral every 6 hours PRN Temp greater or equal to 38C (100.4F), Mild Pain (1 - 3)  heparin   Injectable 5000 Unit(s) IV Push every 6 hours PRN For aPTT less than 40      Allergies    No Known Allergies    Intolerances        Vital Signs Last 24 Hrs  T(C): 36.9 (13 Dec 2021 06:30), Max: 36.9 (13 Dec 2021 06:30)  T(F): 98.4 (13 Dec 2021 06:30), Max: 98.4 (13 Dec 2021 06:30)  HR: 55 (13 Dec 2021 06:30) (55 - 66)  BP: 135/69 (13 Dec 2021 06:30) (135/69 - 170/90)  BP(mean): 90 (12 Dec 2021 18:21) (90 - 90)  RR: 18 (13 Dec 2021 06:30) (16 - 18)  SpO2: 98% (13 Dec 2021 06:30) (97% - 99%)    I&O's Summary    Weight (kg): 80.7 (12-12 @ 17:40)    PHYSICAL EXAM:  TELE: sb 50's   Constitutional: NAD, awake and alert, well-developed  HEENT: Moist Mucous Membranes, Anicteric  Pulmonary: Non-labored, breath sounds are clear bilaterally, No wheezing, crackles or rhonchi  Cardiovascular: Regular, S1 and S2 nl, No murmurs, rubs, gallops or clicks  Gastrointestinal: Bowel Sounds present, soft, nontender.   Lymph: No lymphadenopathy. No peripheral edema.  Skin: No visible rashes or ulcers.  Psych:  Mood & affect appropriate    LABS: All Labs Reviewed:                        14.3   6.01  )-----------( 159      ( 13 Dec 2021 05:30 )             40.1                         13.9   6.07  )-----------( 153      ( 13 Dec 2021 02:33 )             38.8                         14.5   5.75  )-----------( 170      ( 12 Dec 2021 18:37 )             41.1     13 Dec 2021 05:30    142    |  109    |  14     ----------------------------<  147    3.8     |  28     |  1.00   12 Dec 2021 18:37    141    |  107    |  16     ----------------------------<  160    4.1     |  28     |  1.40     Ca    8.8        13 Dec 2021 05:30  Ca    8.7        12 Dec 2021 18:37  Mg     2.3       12 Dec 2021 18:37    TPro  6.6    /  Alb  3.4    /  TBili  0.6    /  DBili  x      /  AST  18     /  ALT  36     /  AlkPhos  81     13 Dec 2021 05:30  TPro  7.0    /  Alb  3.4    /  TBili  0.7    /  DBili  x      /  AST  25     /  ALT  38     /  AlkPhos  95     12 Dec 2021 18:37    PT/INR - ( 12 Dec 2021 20:05 )   PT: 11.9 sec;   INR: 1.02 ratio         PTT - ( 13 Dec 2021 02:33 )  PTT:> 200 sec  CARDIAC MARKERS ( 13 Dec 2021 05:30 )  x     / x     / 65 U/L / x     / x      CARDIAC MARKERS ( 12 Dec 2021 20:05 )  x     / x     / 149 U/L / x     / x             ECG:    Echo:    Radiology:

## 2021-12-13 NOTE — DISCHARGE NOTE PROVIDER - CARE PROVIDER_API CALL
Yoel Nichols)  [facility]  [address] [place] [address]  Phone: [phone]  Fax: [phone]  Follow Up Time: 1 week

## 2021-12-13 NOTE — DISCHARGE NOTE NURSING/CASE MANAGEMENT/SOCIAL WORK - NSDCVIVACCINE_GEN_ALL_CORE_FT
Tdap; 18-Mar-2015 15:55; Lorna Reynolds (RN); [employer]; [id number]; IntraMuscular; Deltoid Right.; 0.5 milliLiter(s); VIS (VIS Published: 09-May-2013, VIS Presented: 18-Mar-2015);

## 2021-12-13 NOTE — DISCHARGE NOTE PROVIDER - HOSPITAL COURSE
76 year old Male (no implantable devices) with a PMHx of CAD s/p remote PCI and known residual closed vessel, HTN, HLD, borderline DM presents with CP. Has been having pressure like CP with walking for last 2 weeks similar to pain he experienced prior to his last PCI. Pt states his symptoms began to worsen-now with associated throat pain and HERNANDEZ. He presented to [facility] ED 1 day ago, trops were negative x2, no acute changes on EKG. Given symptoms he now transfers to [facility] for cardiac cath to further evaluate for ischemia. He currently denies any complaints of chest pain, SOB, palpitations, N/V.  (13 Dec 2021 12:59)    12/13/2021  S/P diagnostic LHC via RRA, prox %  w/collaterals. Pt and site remain stable, free of hematoma, bleeding, swelling. Pt is ready for discharge today with close cardiology followup.

## 2021-12-13 NOTE — DISCHARGE NOTE PROVIDER - NSDCCPTREATMENT_GEN_ALL_CORE_FT
PRINCIPAL PROCEDURE  Procedure: Coronary angiography in cardiac catheterization laboratory  Findings and Treatment: S/P diagnostic cardiac cath via RRA.

## 2021-12-13 NOTE — PROGRESS NOTE ADULT - ATTENDING COMMENTS
Patient with unstable angina.  Patient needs coronary angiography to assess coronary anatomy.  Risk and benefits explained.  Needs to be transferred to  for invasive coronary angiography.  At risk for decompensation given UA.  To follow at  when admitted.  Transfer center called.  Discussed with interventionalist.

## 2021-12-13 NOTE — DISCHARGE NOTE NURSING/CASE MANAGEMENT/SOCIAL WORK - NSDCPEFALRISK_GEN_ALL_CORE
For information on Fall & Injury Prevention, visit: [email or web address] OR  [email or web address] OR  https://www.cdc.gov/steadi/patient.html

## 2021-12-13 NOTE — DISCHARGE NOTE PROVIDER - NSDCCPCAREPLAN_GEN_ALL_CORE_FT
PRINCIPAL DISCHARGE DIAGNOSIS  Diagnosis: CAD (coronary artery disease)  Assessment and Plan of Treatment: Low salt, low fat diet.   Weight management.   Take medications as prescribed.    No smoking.  Follow up appointments with your doctor(s)  as instructed.      SECONDARY DISCHARGE DIAGNOSES  Diagnosis: HTN (hypertension)  Assessment and Plan of Treatment: Low salt diet  Activity as tolerated.  Take all medication as prescribed.  Follow up with your medical doctor for routine blood pressure monitoring at your next visit.  Notify your doctor if you have any of the following symptoms:   Dizziness, Lightheadedness, Blurry vision, Headache, Chest pain, Shortness of breath

## 2021-12-13 NOTE — DISCHARGE NOTE PROVIDER - CARE PROVIDER_API CALL
SHABBIR PENA  Internal Medicine  [address] [place] [id number]  Phone: [phone]  Fax: [phone]  Follow Up Time:

## 2021-12-13 NOTE — DISCHARGE NOTE PROVIDER - NSDCMRMEDTOKEN_GEN_ALL_CORE_FT
aspirin 325 mg oral tablet: 1 tab(s) orally once a day  atenolol 50 mg oral tablet: 1 tab(s) orally once a day  atorvastatin 40 mg oral tablet: 1 tab(s) orally once a day (at bedtime)  clopidogrel 75 mg oral tablet: 1 tab(s) orally once a day  losartan 50 mg oral tablet: 1 tab(s) orally once a day  ranolazine 1000 mg oral tablet, extended release: 1 tab(s) orally once a day

## 2021-12-13 NOTE — PROGRESS NOTE ADULT - ASSESSMENT
76 year old M with hx CAD s/p remote PCI and known residual closed vessel, HTN, HLD boderline DM presents with angina    CAD/HTN/HLD  - Now with typical anginal symptoms.   - EKG without sign ischemic changes  - troponin negative x2   - on IV heparin now with no anginal complaints on exam and negative troponin can discontinue   - Cont DAPT and statin  - cont bb  - cont statin  - cont ranexa and norvasc   - will plan on transfer for coronary angiogram to  - transfer center called   - Further cardiac workup will depend on clinical course    Monitor and replete electrolytes. Keep K>4.0 and Mg>2.0.  Meseret Lamb FNP-C  Cardiology NP  [facility] [phone]    76 year old male with hx CAD s/p remote PCI and known residual closed vessel, HTN, HLD borderline DM presents with angina    CAD/HTN/HLD  - Now with typical anginal symptoms.   - EKG without sign ischemic changes  - troponin negative x2   - on IV heparin now with no anginal complaints on exam and negative troponin and no EKG changes can discontinue   - Cont DAPT and statin  - cont bb  - cont statin  - cont ranexa and norvasc   - will plan on transfer for coronary angiogram to  - transfer center called     Meseret Lamb FNP-C  Cardiology NP  [facility] [phone]

## 2021-12-13 NOTE — DISCHARGE NOTE PROVIDER - NSDCFUADDINST_GEN_ALL_CORE_FT
No heavy lifting or pushing/pulling with procedure arm for 2 weeks. No driving for 2 days. You may shower 24 hours following the procedure but avoid baths/swimming for 1 week. Check your wrist site for bleeding and/or swelling daily following procedure and call your doctor immediately if it occurs or if you experience increased pain at the site. Follow up with your cardiologist in 1-2 weeks. You may call [facility] if you have any questions/concerns regarding your procedure [phone].

## 2021-12-16 LAB
CK MB BLD-MCNC: 0 % — SIGNIFICANT CHANGE UP
CK MB BLD-MCNC: 0 % — SIGNIFICANT CHANGE UP (ref 0–3)
CK MM CFR SERPL: 100 % — SIGNIFICANT CHANGE UP (ref 97–100)
CPK MACRO TYPE 1: 0 % — SIGNIFICANT CHANGE UP
CPK MACRO TYPE 2: 0 % — SIGNIFICANT CHANGE UP
CREATINE KINASE,TOTAL,SERUM: 77 U/L — SIGNIFICANT CHANGE UP (ref 41–331)

## 2021-12-21 ENCOUNTER — APPOINTMENT (OUTPATIENT)
Dept: CARDIOLOGY | Facility: CLINIC | Age: 76
End: 2021-12-21
Payer: MEDICARE

## 2021-12-21 VITALS
HEART RATE: 73 BPM | DIASTOLIC BLOOD PRESSURE: 86 MMHG | WEIGHT: 181 LBS | HEIGHT: 67 IN | OXYGEN SATURATION: 98 % | BODY MASS INDEX: 28.41 KG/M2 | SYSTOLIC BLOOD PRESSURE: 145 MMHG

## 2021-12-21 DIAGNOSIS — Z00.00 ENCOUNTER FOR GENERAL ADULT MEDICAL EXAMINATION W/OUT ABNORMAL FINDINGS: ICD-10-CM

## 2021-12-21 DIAGNOSIS — M25.519 PAIN IN UNSPECIFIED SHOULDER: ICD-10-CM

## 2021-12-21 PROCEDURE — 99204 OFFICE O/P NEW MOD 45 MIN: CPT

## 2021-12-21 PROCEDURE — 93000 ELECTROCARDIOGRAM COMPLETE: CPT

## 2021-12-22 PROCEDURE — 85610 PROTHROMBIN TIME: CPT

## 2021-12-22 PROCEDURE — 82550 ASSAY OF CK (CPK): CPT

## 2021-12-22 PROCEDURE — 36415 COLL VENOUS BLD VENIPUNCTURE: CPT

## 2021-12-22 PROCEDURE — 85730 THROMBOPLASTIN TIME PARTIAL: CPT

## 2021-12-22 PROCEDURE — 87635 SARS-COV-2 COVID-19 AMP PRB: CPT

## 2021-12-22 PROCEDURE — 83735 ASSAY OF MAGNESIUM: CPT

## 2021-12-22 PROCEDURE — 71045 X-RAY EXAM CHEST 1 VIEW: CPT

## 2021-12-22 PROCEDURE — 80053 COMPREHEN METABOLIC PANEL: CPT

## 2021-12-22 PROCEDURE — 85025 COMPLETE CBC W/AUTO DIFF WBC: CPT

## 2021-12-22 PROCEDURE — 82552 ASSAY OF CPK IN BLOOD: CPT

## 2021-12-22 PROCEDURE — 99285 EMERGENCY DEPT VISIT HI MDM: CPT | Mod: 25

## 2021-12-22 PROCEDURE — C1894: CPT

## 2021-12-22 PROCEDURE — 93005 ELECTROCARDIOGRAM TRACING: CPT

## 2021-12-22 PROCEDURE — 83690 ASSAY OF LIPASE: CPT

## 2021-12-22 PROCEDURE — 99152 MOD SED SAME PHYS/QHP 5/>YRS: CPT

## 2021-12-22 PROCEDURE — C1887: CPT

## 2021-12-22 PROCEDURE — C1769: CPT

## 2021-12-22 PROCEDURE — 84484 ASSAY OF TROPONIN QUANT: CPT

## 2021-12-22 PROCEDURE — 85027 COMPLETE CBC AUTOMATED: CPT

## 2021-12-22 PROCEDURE — 93454 CORONARY ARTERY ANGIO S&I: CPT

## 2021-12-22 PROCEDURE — 86803 HEPATITIS C AB TEST: CPT

## 2022-01-04 NOTE — DISCUSSION/SUMMARY
[FreeTextEntry1] : \par Plan:\par 1. Transthoracic echocardiogram \par 2. Patient wishes to discuss with family prior to proceeding with PCI\par 3. continue current medications

## 2022-01-04 NOTE — HISTORY OF PRESENT ILLNESS
[FreeTextEntry1] : Mr Saucedo is a 69 year old man with past history of hypertension, dyslipidemia, previous LCx stenting, who presents to [facility] clinic for evaluation of RCA  PCI. He had been experiencing 2 weeks history of CCS II angina despite atenolol and ranolazine. He underwent diagnostic angiogram 12/13/21 which showed patent LCx stent, but there was a dominant RCA  which subtends a large territory of myocardium. Mr Saucedo is keen to undergo treatment for his refractory angina. We discussed  PCI to his RCA for angina relief and explained the procedural risks and answered all questions.

## 2022-01-12 ENCOUNTER — APPOINTMENT (OUTPATIENT)
Dept: CARDIOLOGY | Facility: CLINIC | Age: 77
End: 2022-01-12
Payer: MEDICARE

## 2022-01-12 DIAGNOSIS — R01.1 CARDIAC MURMUR, UNSPECIFIED: ICD-10-CM

## 2022-01-12 PROCEDURE — 93306 TTE W/DOPPLER COMPLETE: CPT

## 2022-01-21 PROBLEM — R01.1 MURMUR: Status: ACTIVE | Noted: 2022-01-21

## 2025-02-01 ENCOUNTER — EMERGENCY (EMERGENCY)
Facility: HOSPITAL | Age: 80
LOS: 1 days | Discharge: HOME | End: 2025-02-01
Attending: INTERNAL MEDICINE | Admitting: INTERNAL MEDICINE
Payer: MEDICARE

## 2025-02-01 VITALS
WEIGHT: 173.94 LBS | SYSTOLIC BLOOD PRESSURE: 179 MMHG | HEIGHT: 67 IN | RESPIRATION RATE: 16 BRPM | TEMPERATURE: 97 F | DIASTOLIC BLOOD PRESSURE: 91 MMHG | HEART RATE: 62 BPM | OXYGEN SATURATION: 98 %

## 2025-02-01 VITALS
HEART RATE: 69 BPM | TEMPERATURE: 98 F | SYSTOLIC BLOOD PRESSURE: 168 MMHG | DIASTOLIC BLOOD PRESSURE: 87 MMHG | RESPIRATION RATE: 17 BRPM | OXYGEN SATURATION: 97 %

## 2025-02-01 DIAGNOSIS — Z98.89 OTHER SPECIFIED POSTPROCEDURAL STATES: Chronic | ICD-10-CM

## 2025-02-01 LAB
ALBUMIN SERPL ELPH-MCNC: 3.5 G/DL — SIGNIFICANT CHANGE UP (ref 3.3–5)
ALP SERPL-CCNC: 83 U/L — SIGNIFICANT CHANGE UP (ref 40–120)
ALT FLD-CCNC: 30 U/L — SIGNIFICANT CHANGE UP (ref 12–78)
ANION GAP SERPL CALC-SCNC: 4 MMOL/L — LOW (ref 5–17)
AST SERPL-CCNC: 21 U/L — SIGNIFICANT CHANGE UP (ref 15–37)
BASOPHILS # BLD AUTO: 0.03 K/UL — SIGNIFICANT CHANGE UP (ref 0–0.2)
BASOPHILS NFR BLD AUTO: 0.6 % — SIGNIFICANT CHANGE UP (ref 0–2)
BILIRUB SERPL-MCNC: 0.8 MG/DL — SIGNIFICANT CHANGE UP (ref 0.2–1.2)
BUN SERPL-MCNC: 17 MG/DL — SIGNIFICANT CHANGE UP (ref 7–23)
CALCIUM SERPL-MCNC: 8.9 MG/DL — SIGNIFICANT CHANGE UP (ref 8.5–10.1)
CHLORIDE SERPL-SCNC: 108 MMOL/L — SIGNIFICANT CHANGE UP (ref 96–108)
CO2 SERPL-SCNC: 27 MMOL/L — SIGNIFICANT CHANGE UP (ref 22–31)
CREAT SERPL-MCNC: 0.98 MG/DL — SIGNIFICANT CHANGE UP (ref 0.5–1.3)
D DIMER BLD IA.RAPID-MCNC: 183 NG/ML DDU — SIGNIFICANT CHANGE UP
EGFR: 78 ML/MIN/1.73M2 — SIGNIFICANT CHANGE UP
EOSINOPHIL # BLD AUTO: 0.27 K/UL — SIGNIFICANT CHANGE UP (ref 0–0.5)
EOSINOPHIL NFR BLD AUTO: 5.5 % — SIGNIFICANT CHANGE UP (ref 0–6)
GLUCOSE SERPL-MCNC: 120 MG/DL — HIGH (ref 70–99)
HCT VFR BLD CALC: 38.5 % — LOW (ref 39–50)
HGB BLD-MCNC: 13.6 G/DL — SIGNIFICANT CHANGE UP (ref 13–17)
IMM GRANULOCYTES NFR BLD AUTO: 1 % — HIGH (ref 0–0.9)
LYMPHOCYTES # BLD AUTO: 1.89 K/UL — SIGNIFICANT CHANGE UP (ref 1–3.3)
LYMPHOCYTES # BLD AUTO: 38.5 % — SIGNIFICANT CHANGE UP (ref 13–44)
MCHC RBC-ENTMCNC: 31.6 PG — SIGNIFICANT CHANGE UP (ref 27–34)
MCHC RBC-ENTMCNC: 35.3 G/DL — SIGNIFICANT CHANGE UP (ref 32–36)
MCV RBC AUTO: 89.5 FL — SIGNIFICANT CHANGE UP (ref 80–100)
MONOCYTES # BLD AUTO: 0.56 K/UL — SIGNIFICANT CHANGE UP (ref 0–0.9)
MONOCYTES NFR BLD AUTO: 11.4 % — SIGNIFICANT CHANGE UP (ref 2–14)
NEUTROPHILS # BLD AUTO: 2.11 K/UL — SIGNIFICANT CHANGE UP (ref 1.8–7.4)
NEUTROPHILS NFR BLD AUTO: 43 % — SIGNIFICANT CHANGE UP (ref 43–77)
NRBC # BLD: 0 /100 WBCS — SIGNIFICANT CHANGE UP (ref 0–0)
NRBC BLD-RTO: 0 /100 WBCS — SIGNIFICANT CHANGE UP (ref 0–0)
PLATELET # BLD AUTO: 216 K/UL — SIGNIFICANT CHANGE UP (ref 150–400)
POTASSIUM SERPL-MCNC: 3.7 MMOL/L — SIGNIFICANT CHANGE UP (ref 3.5–5.3)
POTASSIUM SERPL-SCNC: 3.7 MMOL/L — SIGNIFICANT CHANGE UP (ref 3.5–5.3)
PROT SERPL-MCNC: 6.8 G/DL — SIGNIFICANT CHANGE UP (ref 6–8.3)
RBC # BLD: 4.3 M/UL — SIGNIFICANT CHANGE UP (ref 4.2–5.8)
RBC # FLD: 13.1 % — SIGNIFICANT CHANGE UP (ref 10.3–14.5)
SODIUM SERPL-SCNC: 139 MMOL/L — SIGNIFICANT CHANGE UP (ref 135–145)
TROPONIN I, HIGH SENSITIVITY RESULT: 8.2 NG/L — SIGNIFICANT CHANGE UP
WBC # BLD: 4.91 K/UL — SIGNIFICANT CHANGE UP (ref 3.8–10.5)
WBC # FLD AUTO: 4.91 K/UL — SIGNIFICANT CHANGE UP (ref 3.8–10.5)

## 2025-02-01 PROCEDURE — 85379 FIBRIN DEGRADATION QUANT: CPT

## 2025-02-01 PROCEDURE — 99285 EMERGENCY DEPT VISIT HI MDM: CPT

## 2025-02-01 PROCEDURE — 71045 X-RAY EXAM CHEST 1 VIEW: CPT | Mod: 26

## 2025-02-01 PROCEDURE — 93005 ELECTROCARDIOGRAM TRACING: CPT

## 2025-02-01 PROCEDURE — 80053 COMPREHEN METABOLIC PANEL: CPT

## 2025-02-01 PROCEDURE — 71045 X-RAY EXAM CHEST 1 VIEW: CPT

## 2025-02-01 PROCEDURE — 96374 THER/PROPH/DIAG INJ IV PUSH: CPT

## 2025-02-01 PROCEDURE — 99285 EMERGENCY DEPT VISIT HI MDM: CPT | Mod: 25

## 2025-02-01 PROCEDURE — 36415 COLL VENOUS BLD VENIPUNCTURE: CPT

## 2025-02-01 PROCEDURE — 84484 ASSAY OF TROPONIN QUANT: CPT

## 2025-02-01 PROCEDURE — 85025 COMPLETE CBC W/AUTO DIFF WBC: CPT

## 2025-02-01 PROCEDURE — 93010 ELECTROCARDIOGRAM REPORT: CPT

## 2025-02-01 RX ORDER — KETOROLAC TROMETHAMINE 10 MG
30 TABLET ORAL ONCE
Refills: 0 | Status: DISCONTINUED | OUTPATIENT
Start: 2025-02-01 | End: 2025-02-01

## 2025-02-01 RX ADMIN — Medication 30 MILLIGRAM(S): at 05:32

## 2025-02-01 NOTE — ED ADULT NURSE NOTE - OBJECTIVE STATEMENT
pt a/o x 4 with a calm affect c/o mid sternal chest pains.  pt states a week ago, he had a respiratory infection with a cough and has had the chest pains since.  EKG completed upon arrival to ED, pending initial lab results.  patient denies shortness of breath at this time

## 2025-02-01 NOTE — ED PROVIDER NOTE - CLINICAL SUMMARY MEDICAL DECISION MAKING FREE TEXT BOX
80 y/o male Patient complaining of chest pin started tonight, para sternal CWP increased with palpation, movement particularly sitting up x 5 days.  he had recent viral illness with cough . no headache, no exertional chest pain, no SOB, no palpitations, no abdominal pain,  no n/v, no neuro changes. VSS Exam stable labs CXR ekg, enzyme and D-dimer are normal, stable for DC to OP care

## 2025-02-01 NOTE — ED PROVIDER NOTE - OBJECTIVE STATEMENT
Patient complaining of chest pin started tonight  chest pain Patient complaining of chest pin started tonight, para sternal CWP increased with palpation, movement particularly sitting up x 5 days.  he had recent viral illness with cough . no headache, no exertional chest pain, no SOB, no palpitations, no abdominal pain,  no n/v, no neuro changes. 78 y/o male Patient complaining of chest pain started tonight, para sternal CWP increased with palpation, movement particularly sitting up x 5 days.  he had recent viral illness with cough . no headache, no exertional chest pain, no SOB, no palpitations, no abdominal pain,  no n/v, no neuro changes.

## 2025-02-01 NOTE — ED PROVIDER NOTE - PATIENT PORTAL LINK FT
You can access the FollowMyHealth Patient Portal offered by [facility] by registering at the following website: [email or web address]. By joining [facility]’s FollowMyHealth portal, you will also be able to view your health information using other applications (apps) compatible with our system.

## 2025-02-01 NOTE — ED ADULT NURSE NOTE - ILLNESS RECENT EXPOSURE
Pt went to [facility] urgent care for possible UTI  Urine clear sent off for culture  Saw Denice loza am for her issues with her hands  Pt's son just wanted Dr Soto to be aware     None known

## 2025-02-01 NOTE — ED PROVIDER NOTE - CARE PROVIDER_API CALL
Mandy Pollack  Cardiology  [address] [place] [address]  Phone: [phone]  Fax: [phone]  Follow Up Time: 1-3 Days

## 2025-02-01 NOTE — ED PROVIDER NOTE - SIGNIFICANT NEGATIVE FINDINGS
no headache, no exertional chest pain, no SOB, no palpitations, no abdominal pain,  no n/v, no neuro changes.